# Patient Record
Sex: FEMALE | Race: BLACK OR AFRICAN AMERICAN | NOT HISPANIC OR LATINO | URBAN - METROPOLITAN AREA
[De-identification: names, ages, dates, MRNs, and addresses within clinical notes are randomized per-mention and may not be internally consistent; named-entity substitution may affect disease eponyms.]

---

## 2017-12-04 ENCOUNTER — EMERGENCY (EMERGENCY)
Facility: HOSPITAL | Age: 27
LOS: 1 days | Discharge: HOME | End: 2017-12-04
Attending: EMERGENCY MEDICINE | Admitting: EMERGENCY MEDICINE
Payer: MEDICAID

## 2017-12-04 VITALS
OXYGEN SATURATION: 100 % | RESPIRATION RATE: 16 BRPM | TEMPERATURE: 99 F | DIASTOLIC BLOOD PRESSURE: 84 MMHG | HEART RATE: 116 BPM | SYSTOLIC BLOOD PRESSURE: 127 MMHG

## 2017-12-04 PROCEDURE — 99281 EMR DPT VST MAYX REQ PHY/QHP: CPT | Mod: 25

## 2017-12-04 PROCEDURE — 10060 I&D ABSCESS SIMPLE/SINGLE: CPT | Mod: RT

## 2017-12-04 RX ORDER — OXYCODONE AND ACETAMINOPHEN 5; 325 MG/1; MG/1
1 TABLET ORAL ONCE
Qty: 0 | Refills: 0 | Status: DISCONTINUED | OUTPATIENT
Start: 2017-12-04 | End: 2017-12-04

## 2017-12-04 RX ORDER — ALPRAZOLAM 0.25 MG
0.5 TABLET ORAL ONCE
Qty: 0 | Refills: 0 | Status: DISCONTINUED | OUTPATIENT
Start: 2017-12-04 | End: 2017-12-04

## 2017-12-04 RX ADMIN — Medication 100 MILLIGRAM(S): at 16:56

## 2017-12-04 RX ADMIN — Medication 0.5 MILLIGRAM(S): at 17:36

## 2017-12-04 RX ADMIN — OXYCODONE AND ACETAMINOPHEN 1 TABLET(S): 5; 325 TABLET ORAL at 16:58

## 2017-12-04 RX ADMIN — OXYCODONE AND ACETAMINOPHEN 1 TABLET(S): 5; 325 TABLET ORAL at 16:30

## 2017-12-04 NOTE — ED PROVIDER NOTE - MEDICAL DECISION MAKING DETAILS
28 y/o female c/o right axillary redness pain and swelling  -possible early abscess vs cellulitis  -US bedside to evaluate localized fluid collection  -pain control, doxycycline   -pmd follow up for wound check

## 2017-12-04 NOTE — ED PROVIDER NOTE - SKIN WOUND TYPE
right axilla: + diffuse mild erythema to axillary area extening to lateral upper chest wall without any focal induration swelling or fluctuance. no bleeding or drainage.

## 2017-12-04 NOTE — ED PROVIDER NOTE - OBJECTIVE STATEMENT
28 y/o female hx asthma, previous axillary infections/abscesses presents to ED c/o right axiallry pain and swelling. Pt. States for the past week she has been experiencing pain redness and swellgin to the right axillary area - states similar to previous infections to the lft underam which she has needed drained. Pt. started taking old amoxicillin which has noted helped. Pt. c./o nt sweats. Denies fever chilsl bleeding or dc from area.

## 2017-12-04 NOTE — ED PROVIDER NOTE - ATTENDING CONTRIBUTION TO CARE
I performed a face to face bedside interview with patient regarding history of present illness, review of symptoms and past medical history. I completed an independent physical exam.  I have discussed patient's plan of care.   I agree with note as stated above, having amended the EMR as needed to reflect my findings. I have discussed the assessment and plan of care.  This includes during the time I functioned as the attending physician for this patient.  Attending Contribution to Care:agree with plan of PA. pt p/w abscess visualized on US. I and D performed with no complications. stable. d/c with abx, and f/u in 48 hours. return to ed. pt tolerated procedure. vss.

## 2017-12-06 ENCOUNTER — EMERGENCY (EMERGENCY)
Facility: HOSPITAL | Age: 27
LOS: 1 days | Discharge: LEFT BEFORE DISCHARGE | End: 2017-12-06
Admitting: EMERGENCY MEDICINE

## 2017-12-06 LAB — SPECIMEN SOURCE: SIGNIFICANT CHANGE UP

## 2017-12-07 LAB — BACTERIA WND CULT: SIGNIFICANT CHANGE UP

## 2017-12-07 NOTE — ED POST DISCHARGE NOTE - RESULT SUMMARY
WCX: [place] coag. neg.  No S/R profile.  Pt discharged on Doxycycline 100mg BID x 10 days.  Appropriate care in ED.  No call back necessary.

## 2018-03-13 PROBLEM — L73.2 HIDRADENITIS SUPPURATIVA OF LEFT AXILLA: Status: RESOLVED | Noted: 2018-03-13 | Resolved: 2018-03-13

## 2018-03-20 ENCOUNTER — APPOINTMENT (OUTPATIENT)
Dept: SURGERY | Facility: CLINIC | Age: 28
End: 2018-03-20

## 2018-03-20 DIAGNOSIS — L73.2 HIDRADENITIS SUPPURATIVA: ICD-10-CM

## 2018-04-12 ENCOUNTER — APPOINTMENT (OUTPATIENT)
Dept: ANTEPARTUM | Facility: CLINIC | Age: 28
End: 2018-04-12
Payer: MEDICAID

## 2018-04-12 ENCOUNTER — ASOB RESULT (OUTPATIENT)
Age: 28
End: 2018-04-12

## 2018-04-12 PROCEDURE — 36416 COLLJ CAPILLARY BLOOD SPEC: CPT

## 2018-04-12 PROCEDURE — 76813 OB US NUCHAL MEAS 1 GEST: CPT

## 2018-04-12 PROCEDURE — 76801 OB US < 14 WKS SINGLE FETUS: CPT

## 2018-06-05 ENCOUNTER — APPOINTMENT (OUTPATIENT)
Dept: ANTEPARTUM | Facility: CLINIC | Age: 28
End: 2018-06-05
Payer: MEDICAID

## 2018-06-05 ENCOUNTER — ASOB RESULT (OUTPATIENT)
Age: 28
End: 2018-06-05

## 2018-06-05 PROCEDURE — 76811 OB US DETAILED SNGL FETUS: CPT

## 2018-08-22 PROBLEM — J45.909 UNSPECIFIED ASTHMA, UNCOMPLICATED: Chronic | Status: ACTIVE | Noted: 2017-12-04

## 2018-09-04 ENCOUNTER — ASOB RESULT (OUTPATIENT)
Age: 28
End: 2018-09-04

## 2018-09-04 ENCOUNTER — APPOINTMENT (OUTPATIENT)
Dept: MATERNAL FETAL MEDICINE | Facility: CLINIC | Age: 28
End: 2018-09-04
Payer: MEDICAID

## 2018-09-04 DIAGNOSIS — O24.919 UNSPECIFIED DIABETES MELLITUS IN PREGNANCY, UNSPECIFIED TRIMESTER: ICD-10-CM

## 2018-09-04 PROCEDURE — G0109 DIAB MANAGE TRN IND/GROUP: CPT

## 2018-09-04 RX ORDER — LANCETS 33 GAUGE
EACH MISCELLANEOUS
Qty: 1 | Refills: 3 | Status: ACTIVE | COMMUNITY
Start: 2018-09-04 | End: 1900-01-01

## 2018-09-04 RX ORDER — BLOOD SUGAR DIAGNOSTIC
STRIP MISCELLANEOUS 4 TIMES DAILY
Qty: 1 | Refills: 5 | Status: ACTIVE | COMMUNITY
Start: 2018-09-04 | End: 1900-01-01

## 2018-09-11 ENCOUNTER — MESSAGE (OUTPATIENT)
Age: 28
End: 2018-09-11

## 2018-09-24 ENCOUNTER — ASOB RESULT (OUTPATIENT)
Age: 28
End: 2018-09-24

## 2018-09-24 ENCOUNTER — APPOINTMENT (OUTPATIENT)
Dept: ANTEPARTUM | Facility: CLINIC | Age: 28
End: 2018-09-24
Payer: MEDICAID

## 2018-09-24 ENCOUNTER — APPOINTMENT (OUTPATIENT)
Dept: MATERNAL FETAL MEDICINE | Facility: CLINIC | Age: 28
End: 2018-09-24
Payer: MEDICAID

## 2018-09-24 PROCEDURE — 76805 OB US >/= 14 WKS SNGL FETUS: CPT

## 2018-09-24 PROCEDURE — 76819 FETAL BIOPHYS PROFIL W/O NST: CPT

## 2018-09-24 PROCEDURE — G0108 DIAB MANAGE TRN  PER INDIV: CPT

## 2018-10-02 ENCOUNTER — APPOINTMENT (OUTPATIENT)
Dept: MATERNAL FETAL MEDICINE | Facility: CLINIC | Age: 28
End: 2018-10-02
Payer: MEDICAID

## 2018-10-02 ENCOUNTER — ASOB RESULT (OUTPATIENT)
Age: 28
End: 2018-10-02

## 2018-10-02 PROCEDURE — G0108 DIAB MANAGE TRN  PER INDIV: CPT

## 2018-10-03 RX ORDER — METFORMIN ER 500 MG 500 MG/1
500 TABLET ORAL DAILY
Qty: 60 | Refills: 3 | Status: ACTIVE | COMMUNITY
Start: 2018-10-02 | End: 1900-01-01

## 2018-10-08 ENCOUNTER — OUTPATIENT (OUTPATIENT)
Dept: OUTPATIENT SERVICES | Facility: HOSPITAL | Age: 28
LOS: 1 days | End: 2018-10-08

## 2018-10-08 DIAGNOSIS — O26.899 OTHER SPECIFIED PREGNANCY RELATED CONDITIONS, UNSPECIFIED TRIMESTER: ICD-10-CM

## 2018-10-08 DIAGNOSIS — Z3A.00 WEEKS OF GESTATION OF PREGNANCY NOT SPECIFIED: ICD-10-CM

## 2018-10-10 ENCOUNTER — APPOINTMENT (OUTPATIENT)
Dept: MATERNAL FETAL MEDICINE | Facility: CLINIC | Age: 28
End: 2018-10-10

## 2018-10-10 ENCOUNTER — APPOINTMENT (OUTPATIENT)
Dept: ANTEPARTUM | Facility: CLINIC | Age: 28
End: 2018-10-10

## 2018-10-24 ENCOUNTER — TRANSCRIPTION ENCOUNTER (OUTPATIENT)
Age: 28
End: 2018-10-24

## 2018-10-24 ENCOUNTER — INPATIENT (INPATIENT)
Facility: HOSPITAL | Age: 28
LOS: 1 days | Discharge: HOME | End: 2018-10-26
Attending: SPECIALIST | Admitting: SPECIALIST
Payer: MEDICAID

## 2018-10-24 VITALS — SYSTOLIC BLOOD PRESSURE: 131 MMHG | HEART RATE: 101 BPM | TEMPERATURE: 98 F | DIASTOLIC BLOOD PRESSURE: 78 MMHG

## 2018-10-24 LAB
ALBUMIN SERPL ELPH-MCNC: 3.1 G/DL — LOW (ref 3.3–5)
ALP SERPL-CCNC: 227 U/L — HIGH (ref 40–120)
ALT FLD-CCNC: 42 U/L — HIGH (ref 4–33)
APPEARANCE UR: SIGNIFICANT CHANGE UP
APTT BLD: 18.1 SEC — LOW (ref 27.5–37.4)
AST SERPL-CCNC: 55 U/L — HIGH (ref 4–32)
BASOPHILS # BLD AUTO: 0.04 K/UL — SIGNIFICANT CHANGE UP (ref 0–0.2)
BASOPHILS # BLD AUTO: 0.05 K/UL — SIGNIFICANT CHANGE UP (ref 0–0.2)
BASOPHILS NFR BLD AUTO: 0.2 % — SIGNIFICANT CHANGE UP (ref 0–2)
BASOPHILS NFR BLD AUTO: 0.3 % — SIGNIFICANT CHANGE UP (ref 0–2)
BILIRUB SERPL-MCNC: 0.3 MG/DL — SIGNIFICANT CHANGE UP (ref 0.2–1.2)
BILIRUB UR-MCNC: NEGATIVE — SIGNIFICANT CHANGE UP
BLD GP AB SCN SERPL QL: NEGATIVE — SIGNIFICANT CHANGE UP
BLOOD UR QL VISUAL: SIGNIFICANT CHANGE UP
BUN SERPL-MCNC: 9 MG/DL — SIGNIFICANT CHANGE UP (ref 7–23)
CALCIUM SERPL-MCNC: 9.7 MG/DL — SIGNIFICANT CHANGE UP (ref 8.4–10.5)
CHLORIDE SERPL-SCNC: 101 MMOL/L — SIGNIFICANT CHANGE UP (ref 98–107)
CO2 SERPL-SCNC: 18 MMOL/L — LOW (ref 22–31)
COLOR SPEC: SIGNIFICANT CHANGE UP
CREAT ?TM UR-MCNC: 94.5 MG/DL — SIGNIFICANT CHANGE UP
CREAT SERPL-MCNC: 0.73 MG/DL — SIGNIFICANT CHANGE UP (ref 0.5–1.3)
EOSINOPHIL # BLD AUTO: 0.01 K/UL — SIGNIFICANT CHANGE UP (ref 0–0.5)
EOSINOPHIL # BLD AUTO: 0.18 K/UL — SIGNIFICANT CHANGE UP (ref 0–0.5)
EOSINOPHIL NFR BLD AUTO: 0 % — SIGNIFICANT CHANGE UP (ref 0–6)
EOSINOPHIL NFR BLD AUTO: 1.4 % — SIGNIFICANT CHANGE UP (ref 0–6)
FIBRINOGEN PPP-MCNC: 685.2 MG/DL — HIGH (ref 310–510)
GLUCOSE BLDC GLUCOMTR-MCNC: 103 MG/DL — HIGH (ref 70–99)
GLUCOSE SERPL-MCNC: 129 MG/DL — HIGH (ref 70–99)
GLUCOSE UR-MCNC: NEGATIVE — SIGNIFICANT CHANGE UP
HCT VFR BLD CALC: 32.6 % — LOW (ref 34.5–45)
HCT VFR BLD CALC: 36.2 % — SIGNIFICANT CHANGE UP (ref 34.5–45)
HGB BLD-MCNC: 10.9 G/DL — LOW (ref 11.5–15.5)
HGB BLD-MCNC: 11.9 G/DL — SIGNIFICANT CHANGE UP (ref 11.5–15.5)
IMM GRANULOCYTES # BLD AUTO: 0.09 # — SIGNIFICANT CHANGE UP
IMM GRANULOCYTES # BLD AUTO: 0.19 # — SIGNIFICANT CHANGE UP
IMM GRANULOCYTES NFR BLD AUTO: 0.7 % — SIGNIFICANT CHANGE UP (ref 0–1.5)
IMM GRANULOCYTES NFR BLD AUTO: 0.8 % — SIGNIFICANT CHANGE UP (ref 0–1.5)
INR BLD: 0.94 — SIGNIFICANT CHANGE UP (ref 0.88–1.17)
KETONES UR-MCNC: NEGATIVE — SIGNIFICANT CHANGE UP
LDH SERPL L TO P-CCNC: 281 U/L — HIGH (ref 135–225)
LEUKOCYTE ESTERASE UR-ACNC: SIGNIFICANT CHANGE UP
LYMPHOCYTES # BLD AUTO: 1.22 K/UL — SIGNIFICANT CHANGE UP (ref 1–3.3)
LYMPHOCYTES # BLD AUTO: 2.75 K/UL — SIGNIFICANT CHANGE UP (ref 1–3.3)
LYMPHOCYTES # BLD AUTO: 21.3 % — SIGNIFICANT CHANGE UP (ref 13–44)
LYMPHOCYTES # BLD AUTO: 5.3 % — LOW (ref 13–44)
MAGNESIUM SERPL-MCNC: 4.7 MG/DL — HIGH (ref 1.6–2.6)
MCHC RBC-ENTMCNC: 27.7 PG — SIGNIFICANT CHANGE UP (ref 27–34)
MCHC RBC-ENTMCNC: 28.2 PG — SIGNIFICANT CHANGE UP (ref 27–34)
MCHC RBC-ENTMCNC: 32.9 % — SIGNIFICANT CHANGE UP (ref 32–36)
MCHC RBC-ENTMCNC: 33.4 % — SIGNIFICANT CHANGE UP (ref 32–36)
MCV RBC AUTO: 84.4 FL — SIGNIFICANT CHANGE UP (ref 80–100)
MCV RBC AUTO: 84.5 FL — SIGNIFICANT CHANGE UP (ref 80–100)
MONOCYTES # BLD AUTO: 0.98 K/UL — HIGH (ref 0–0.9)
MONOCYTES # BLD AUTO: 1.16 K/UL — HIGH (ref 0–0.9)
MONOCYTES NFR BLD AUTO: 5 % — SIGNIFICANT CHANGE UP (ref 2–14)
MONOCYTES NFR BLD AUTO: 7.6 % — SIGNIFICANT CHANGE UP (ref 2–14)
NEUTROPHILS # BLD AUTO: 20.46 K/UL — HIGH (ref 1.8–7.4)
NEUTROPHILS # BLD AUTO: 8.9 K/UL — HIGH (ref 1.8–7.4)
NEUTROPHILS NFR BLD AUTO: 68.7 % — SIGNIFICANT CHANGE UP (ref 43–77)
NEUTROPHILS NFR BLD AUTO: 88.7 % — HIGH (ref 43–77)
NITRITE UR-MCNC: NEGATIVE — SIGNIFICANT CHANGE UP
NRBC # FLD: 0 — SIGNIFICANT CHANGE UP
NRBC # FLD: 0 — SIGNIFICANT CHANGE UP
PH UR: 6 — SIGNIFICANT CHANGE UP (ref 5–8)
PLATELET # BLD AUTO: 308 K/UL — SIGNIFICANT CHANGE UP (ref 150–400)
PLATELET # BLD AUTO: 349 K/UL — SIGNIFICANT CHANGE UP (ref 150–400)
PMV BLD: 10 FL — SIGNIFICANT CHANGE UP (ref 7–13)
PMV BLD: 10.1 FL — SIGNIFICANT CHANGE UP (ref 7–13)
POTASSIUM SERPL-MCNC: 3.8 MMOL/L — SIGNIFICANT CHANGE UP (ref 3.5–5.3)
POTASSIUM SERPL-SCNC: 3.8 MMOL/L — SIGNIFICANT CHANGE UP (ref 3.5–5.3)
PROT SERPL-MCNC: 7 G/DL — SIGNIFICANT CHANGE UP (ref 6–8.3)
PROT UR-MCNC: > 200 MG/DL — SIGNIFICANT CHANGE UP
PROT UR-MCNC: >300 — SIGNIFICANT CHANGE UP
PROTHROM AB SERPL-ACNC: 10.8 SEC — SIGNIFICANT CHANGE UP (ref 9.8–13.1)
RBC # BLD: 3.86 M/UL — SIGNIFICANT CHANGE UP (ref 3.8–5.2)
RBC # BLD: 4.29 M/UL — SIGNIFICANT CHANGE UP (ref 3.8–5.2)
RBC # FLD: 16.9 % — HIGH (ref 10.3–14.5)
RBC # FLD: 17.1 % — HIGH (ref 10.3–14.5)
RBC CASTS # UR COMP ASSIST: >50 — HIGH (ref 0–?)
RH IG SCN BLD-IMP: POSITIVE — SIGNIFICANT CHANGE UP
SODIUM SERPL-SCNC: 137 MMOL/L — SIGNIFICANT CHANGE UP (ref 135–145)
SP GR SPEC: 1.02 — SIGNIFICANT CHANGE UP (ref 1–1.04)
T PALLIDUM AB TITR SER: NEGATIVE — SIGNIFICANT CHANGE UP
URATE SERPL-MCNC: 8.4 MG/DL — HIGH (ref 2.5–7)
UROBILINOGEN FLD QL: NORMAL — SIGNIFICANT CHANGE UP
WBC # BLD: 12.94 K/UL — HIGH (ref 3.8–10.5)
WBC # BLD: 23.09 K/UL — HIGH (ref 3.8–10.5)
WBC # FLD AUTO: 12.94 K/UL — HIGH (ref 3.8–10.5)
WBC # FLD AUTO: 23.09 K/UL — HIGH (ref 3.8–10.5)
WBC UR QL: SIGNIFICANT CHANGE UP (ref 0–?)

## 2018-10-24 PROCEDURE — 93010 ELECTROCARDIOGRAM REPORT: CPT

## 2018-10-24 RX ORDER — SODIUM CHLORIDE 9 MG/ML
1000 INJECTION, SOLUTION INTRAVENOUS
Qty: 0 | Refills: 0 | Status: DISCONTINUED | OUTPATIENT
Start: 2018-10-24 | End: 2018-10-24

## 2018-10-24 RX ORDER — HYDROCORTISONE 1 %
1 OINTMENT (GRAM) TOPICAL EVERY 4 HOURS
Qty: 0 | Refills: 0 | Status: DISCONTINUED | OUTPATIENT
Start: 2018-10-24 | End: 2018-10-24

## 2018-10-24 RX ORDER — CEFAZOLIN SODIUM 1 G
2000 VIAL (EA) INJECTION ONCE
Qty: 0 | Refills: 0 | Status: COMPLETED | OUTPATIENT
Start: 2018-10-24 | End: 2018-10-24

## 2018-10-24 RX ORDER — OXYCODONE HYDROCHLORIDE 5 MG/1
5 TABLET ORAL EVERY 4 HOURS
Qty: 0 | Refills: 0 | Status: DISCONTINUED | OUTPATIENT
Start: 2018-10-24 | End: 2018-10-26

## 2018-10-24 RX ORDER — ACETAMINOPHEN 500 MG
975 TABLET ORAL EVERY 6 HOURS
Qty: 0 | Refills: 0 | Status: DISCONTINUED | OUTPATIENT
Start: 2018-10-24 | End: 2018-10-24

## 2018-10-24 RX ORDER — SODIUM CHLORIDE 9 MG/ML
500 INJECTION, SOLUTION INTRAVENOUS
Qty: 0 | Refills: 0 | Status: DISCONTINUED | OUTPATIENT
Start: 2018-10-24 | End: 2018-10-24

## 2018-10-24 RX ORDER — ACETAMINOPHEN 500 MG
975 TABLET ORAL EVERY 6 HOURS
Qty: 0 | Refills: 0 | Status: COMPLETED | OUTPATIENT
Start: 2018-10-24 | End: 2019-09-22

## 2018-10-24 RX ORDER — HYDROCORTISONE 1 %
1 OINTMENT (GRAM) TOPICAL EVERY 4 HOURS
Qty: 0 | Refills: 0 | Status: DISCONTINUED | OUTPATIENT
Start: 2018-10-24 | End: 2018-10-26

## 2018-10-24 RX ORDER — IBUPROFEN 200 MG
600 TABLET ORAL EVERY 6 HOURS
Qty: 0 | Refills: 0 | Status: DISCONTINUED | OUTPATIENT
Start: 2018-10-24 | End: 2018-10-26

## 2018-10-24 RX ORDER — AER TRAVELER 0.5 G/1
1 SOLUTION RECTAL; TOPICAL EVERY 4 HOURS
Qty: 0 | Refills: 0 | Status: DISCONTINUED | OUTPATIENT
Start: 2018-10-24 | End: 2018-10-26

## 2018-10-24 RX ORDER — AER TRAVELER 0.5 G/1
1 SOLUTION RECTAL; TOPICAL EVERY 4 HOURS
Qty: 0 | Refills: 0 | Status: DISCONTINUED | OUTPATIENT
Start: 2018-10-24 | End: 2018-10-24

## 2018-10-24 RX ORDER — KETOROLAC TROMETHAMINE 30 MG/ML
30 SYRINGE (ML) INJECTION ONCE
Qty: 0 | Refills: 0 | Status: DISCONTINUED | OUTPATIENT
Start: 2018-10-24 | End: 2018-10-24

## 2018-10-24 RX ORDER — DIBUCAINE 1 %
1 OINTMENT (GRAM) RECTAL EVERY 4 HOURS
Qty: 0 | Refills: 0 | Status: DISCONTINUED | OUTPATIENT
Start: 2018-10-24 | End: 2018-10-24

## 2018-10-24 RX ORDER — OXYTOCIN 10 UNIT/ML
41.67 VIAL (ML) INJECTION
Qty: 20 | Refills: 0 | Status: DISCONTINUED | OUTPATIENT
Start: 2018-10-24 | End: 2018-10-24

## 2018-10-24 RX ORDER — SODIUM CHLORIDE 9 MG/ML
500 INJECTION, SOLUTION INTRAVENOUS ONCE
Qty: 0 | Refills: 0 | Status: COMPLETED | OUTPATIENT
Start: 2018-10-24 | End: 2018-10-24

## 2018-10-24 RX ORDER — SODIUM CHLORIDE 9 MG/ML
3 INJECTION INTRAMUSCULAR; INTRAVENOUS; SUBCUTANEOUS EVERY 8 HOURS
Qty: 0 | Refills: 0 | Status: DISCONTINUED | OUTPATIENT
Start: 2018-10-24 | End: 2018-10-26

## 2018-10-24 RX ORDER — MAGNESIUM SULFATE 500 MG/ML
2 VIAL (ML) INJECTION
Qty: 40 | Refills: 0 | Status: DISCONTINUED | OUTPATIENT
Start: 2018-10-24 | End: 2018-10-25

## 2018-10-24 RX ORDER — DOCUSATE SODIUM 100 MG
100 CAPSULE ORAL
Qty: 0 | Refills: 0 | Status: DISCONTINUED | OUTPATIENT
Start: 2018-10-24 | End: 2018-10-26

## 2018-10-24 RX ORDER — MAGNESIUM SULFATE 500 MG/ML
4 VIAL (ML) INJECTION ONCE
Qty: 0 | Refills: 0 | Status: COMPLETED | OUTPATIENT
Start: 2018-10-24 | End: 2018-10-24

## 2018-10-24 RX ORDER — OXYCODONE HYDROCHLORIDE 5 MG/1
5 TABLET ORAL
Qty: 0 | Refills: 0 | Status: DISCONTINUED | OUTPATIENT
Start: 2018-10-24 | End: 2018-10-26

## 2018-10-24 RX ORDER — PRAMOXINE HYDROCHLORIDE 150 MG/15G
1 AEROSOL, FOAM RECTAL EVERY 4 HOURS
Qty: 0 | Refills: 0 | Status: DISCONTINUED | OUTPATIENT
Start: 2018-10-24 | End: 2018-10-26

## 2018-10-24 RX ORDER — INFLUENZA VIRUS VACCINE 15; 15; 15; 15 UG/.5ML; UG/.5ML; UG/.5ML; UG/.5ML
0.5 SUSPENSION INTRAMUSCULAR ONCE
Qty: 0 | Refills: 0 | Status: COMPLETED | OUTPATIENT
Start: 2018-10-24 | End: 2018-10-24

## 2018-10-24 RX ORDER — TETANUS TOXOID, REDUCED DIPHTHERIA TOXOID AND ACELLULAR PERTUSSIS VACCINE, ADSORBED 5; 2.5; 8; 8; 2.5 [IU]/.5ML; [IU]/.5ML; UG/.5ML; UG/.5ML; UG/.5ML
0.5 SUSPENSION INTRAMUSCULAR ONCE
Qty: 0 | Refills: 0 | Status: DISCONTINUED | OUTPATIENT
Start: 2018-10-24 | End: 2018-10-26

## 2018-10-24 RX ORDER — GLYCERIN ADULT
1 SUPPOSITORY, RECTAL RECTAL AT BEDTIME
Qty: 0 | Refills: 0 | Status: DISCONTINUED | OUTPATIENT
Start: 2018-10-24 | End: 2018-10-26

## 2018-10-24 RX ORDER — PRAMOXINE HYDROCHLORIDE 150 MG/15G
1 AEROSOL, FOAM RECTAL EVERY 4 HOURS
Qty: 0 | Refills: 0 | Status: DISCONTINUED | OUTPATIENT
Start: 2018-10-24 | End: 2018-10-24

## 2018-10-24 RX ORDER — SIMETHICONE 80 MG/1
80 TABLET, CHEWABLE ORAL EVERY 6 HOURS
Qty: 0 | Refills: 0 | Status: DISCONTINUED | OUTPATIENT
Start: 2018-10-24 | End: 2018-10-26

## 2018-10-24 RX ORDER — OXYTOCIN 10 UNIT/ML
41.67 VIAL (ML) INJECTION
Qty: 20 | Refills: 0 | Status: DISCONTINUED | OUTPATIENT
Start: 2018-10-24 | End: 2018-10-25

## 2018-10-24 RX ORDER — SODIUM CHLORIDE 9 MG/ML
1000 INJECTION, SOLUTION INTRAVENOUS
Qty: 0 | Refills: 0 | Status: DISCONTINUED | OUTPATIENT
Start: 2018-10-24 | End: 2018-10-25

## 2018-10-24 RX ORDER — IBUPROFEN 200 MG
600 TABLET ORAL EVERY 6 HOURS
Qty: 0 | Refills: 0 | Status: DISCONTINUED | OUTPATIENT
Start: 2018-10-24 | End: 2018-10-24

## 2018-10-24 RX ORDER — OXYTOCIN 10 UNIT/ML
333.33 VIAL (ML) INJECTION
Qty: 20 | Refills: 0 | Status: COMPLETED | OUTPATIENT
Start: 2018-10-24

## 2018-10-24 RX ORDER — DIBUCAINE 1 %
1 OINTMENT (GRAM) RECTAL EVERY 4 HOURS
Qty: 0 | Refills: 0 | Status: DISCONTINUED | OUTPATIENT
Start: 2018-10-24 | End: 2018-10-26

## 2018-10-24 RX ORDER — IBUPROFEN 200 MG
600 TABLET ORAL EVERY 6 HOURS
Qty: 0 | Refills: 0 | Status: COMPLETED | OUTPATIENT
Start: 2018-10-24 | End: 2019-09-22

## 2018-10-24 RX ORDER — SODIUM CHLORIDE 9 MG/ML
1000 INJECTION, SOLUTION INTRAVENOUS ONCE
Qty: 0 | Refills: 0 | Status: DISCONTINUED | OUTPATIENT
Start: 2018-10-24 | End: 2018-10-24

## 2018-10-24 RX ORDER — MAGNESIUM HYDROXIDE 400 MG/1
30 TABLET, CHEWABLE ORAL
Qty: 0 | Refills: 0 | Status: DISCONTINUED | OUTPATIENT
Start: 2018-10-24 | End: 2018-10-26

## 2018-10-24 RX ORDER — SODIUM CHLORIDE 9 MG/ML
3 INJECTION INTRAMUSCULAR; INTRAVENOUS; SUBCUTANEOUS EVERY 8 HOURS
Qty: 0 | Refills: 0 | Status: DISCONTINUED | OUTPATIENT
Start: 2018-10-24 | End: 2018-10-24

## 2018-10-24 RX ORDER — ACETAMINOPHEN 500 MG
975 TABLET ORAL EVERY 6 HOURS
Qty: 0 | Refills: 0 | Status: DISCONTINUED | OUTPATIENT
Start: 2018-10-24 | End: 2018-10-26

## 2018-10-24 RX ORDER — OXYTOCIN 10 UNIT/ML
333.33 VIAL (ML) INJECTION
Qty: 20 | Refills: 0 | Status: DISCONTINUED | OUTPATIENT
Start: 2018-10-24 | End: 2018-10-25

## 2018-10-24 RX ORDER — LANOLIN
1 OINTMENT (GRAM) TOPICAL EVERY 6 HOURS
Qty: 0 | Refills: 0 | Status: DISCONTINUED | OUTPATIENT
Start: 2018-10-24 | End: 2018-10-26

## 2018-10-24 RX ORDER — DIPHENHYDRAMINE HCL 50 MG
25 CAPSULE ORAL EVERY 6 HOURS
Qty: 0 | Refills: 0 | Status: DISCONTINUED | OUTPATIENT
Start: 2018-10-24 | End: 2018-10-26

## 2018-10-24 RX ADMIN — Medication 50 GM/HR: at 13:06

## 2018-10-24 RX ADMIN — Medication 0.25 MILLIGRAM(S): at 05:21

## 2018-10-24 RX ADMIN — Medication 300 GRAM(S): at 10:23

## 2018-10-24 RX ADMIN — SODIUM CHLORIDE 125 MILLILITER(S): 9 INJECTION, SOLUTION INTRAVENOUS at 05:15

## 2018-10-24 RX ADMIN — Medication 100 MILLIGRAM(S): at 10:55

## 2018-10-24 RX ADMIN — Medication 50 GM/HR: at 10:55

## 2018-10-24 RX ADMIN — Medication 125 MILLIUNIT(S)/MIN: at 06:57

## 2018-10-24 RX ADMIN — Medication 50 GM/HR: at 19:12

## 2018-10-24 RX ADMIN — SODIUM CHLORIDE 50 MILLILITER(S): 9 INJECTION, SOLUTION INTRAVENOUS at 11:21

## 2018-10-24 RX ADMIN — Medication 30 MILLIGRAM(S): at 07:15

## 2018-10-24 RX ADMIN — Medication 1000 MILLIUNIT(S)/MIN: at 06:32

## 2018-10-24 RX ADMIN — Medication 30 MILLIGRAM(S): at 06:36

## 2018-10-24 RX ADMIN — SODIUM CHLORIDE 1000 MILLILITER(S): 9 INJECTION, SOLUTION INTRAVENOUS at 09:20

## 2018-10-24 NOTE — DISCHARGE NOTE OB - CARE PLAN
Principal Discharge DX:	 (normal spontaneous vaginal delivery)  Goal:	Good recovery  Assessment and plan of treatment:	f/u 6 weeks

## 2018-10-24 NOTE — DISCHARGE NOTE OB - PATIENT PORTAL LINK FT
You can access the [facility], offered by [facility], by registering with the following website: [email or web address]

## 2018-10-24 NOTE — PATIENT PROFILE OB - PRO HIV INFANT
NO DIABETIC RETINOPATHY NOTED ON TODAYS EXAM: RETURN FOR FOLLOW-UP AS SCHEDULED FOR DILATED EXAM AND OCT FOR ANY PROGRESSION. negative

## 2018-10-24 NOTE — DISCHARGE NOTE OB - CARE PROVIDER_API CALL
Lucia Wilson), Obstetrics and Gynecology  [address] [place] [address]  Phone: [phone]  Fax: [phone]

## 2018-10-24 NOTE — DISCHARGE NOTE OB - MATERIALS PROVIDED
Shaken Baby Prevention Handout/Discharge Medication Information for Patients and Families Pocket Guide/  Immunization Record/[facility] [place] Screening Program/Guide to Postpartum Care

## 2018-10-25 LAB
BASOPHILS # BLD AUTO: 0.04 K/UL — SIGNIFICANT CHANGE UP (ref 0–0.2)
BASOPHILS NFR BLD AUTO: 0.2 % — SIGNIFICANT CHANGE UP (ref 0–2)
BASOPHILS NFR SPEC: 0 % — SIGNIFICANT CHANGE UP (ref 0–2)
BLASTS # FLD: 0 % — SIGNIFICANT CHANGE UP (ref 0–0)
EOSINOPHIL # BLD AUTO: 0.1 K/UL — SIGNIFICANT CHANGE UP (ref 0–0.5)
EOSINOPHIL NFR BLD AUTO: 0.5 % — SIGNIFICANT CHANGE UP (ref 0–6)
EOSINOPHIL NFR FLD: 0.9 % — SIGNIFICANT CHANGE UP (ref 0–6)
GIANT PLATELETS BLD QL SMEAR: PRESENT — SIGNIFICANT CHANGE UP
HCT VFR BLD CALC: 28.9 % — LOW (ref 34.5–45)
HGB BLD-MCNC: 9.3 G/DL — LOW (ref 11.5–15.5)
HYPOCHROMIA BLD QL: SLIGHT — SIGNIFICANT CHANGE UP
IMM GRANULOCYTES # BLD AUTO: 0.13 # — SIGNIFICANT CHANGE UP
IMM GRANULOCYTES NFR BLD AUTO: 0.6 % — SIGNIFICANT CHANGE UP (ref 0–1.5)
LYMPHOCYTES # BLD AUTO: 13.5 % — SIGNIFICANT CHANGE UP (ref 13–44)
LYMPHOCYTES # BLD AUTO: 2.74 K/UL — SIGNIFICANT CHANGE UP (ref 1–3.3)
LYMPHOCYTES NFR SPEC AUTO: 15.9 % — SIGNIFICANT CHANGE UP (ref 13–44)
MAGNESIUM SERPL-MCNC: 5.4 MG/DL — HIGH (ref 1.6–2.6)
MAGNESIUM SERPL-MCNC: 5.7 MG/DL — HIGH (ref 1.6–2.6)
MCHC RBC-ENTMCNC: 27 PG — SIGNIFICANT CHANGE UP (ref 27–34)
MCHC RBC-ENTMCNC: 32.2 % — SIGNIFICANT CHANGE UP (ref 32–36)
MCV RBC AUTO: 83.8 FL — SIGNIFICANT CHANGE UP (ref 80–100)
METAMYELOCYTES # FLD: 0 % — SIGNIFICANT CHANGE UP (ref 0–1)
MICROCYTES BLD QL: SLIGHT — SIGNIFICANT CHANGE UP
MONOCYTES # BLD AUTO: 1.54 K/UL — HIGH (ref 0–0.9)
MONOCYTES NFR BLD AUTO: 7.6 % — SIGNIFICANT CHANGE UP (ref 2–14)
MONOCYTES NFR BLD: 4.4 % — SIGNIFICANT CHANGE UP (ref 2–9)
MYELOCYTES NFR BLD: 0 % — SIGNIFICANT CHANGE UP (ref 0–0)
NEUTROPHIL AB SER-ACNC: 77 % — SIGNIFICANT CHANGE UP (ref 43–77)
NEUTROPHILS # BLD AUTO: 15.71 K/UL — HIGH (ref 1.8–7.4)
NEUTROPHILS NFR BLD AUTO: 77.6 % — HIGH (ref 43–77)
NEUTS BAND # BLD: 0 % — SIGNIFICANT CHANGE UP (ref 0–6)
NRBC # FLD: 0 — SIGNIFICANT CHANGE UP
OTHER - HEMATOLOGY %: 0 — SIGNIFICANT CHANGE UP
PLATELET # BLD AUTO: 297 K/UL — SIGNIFICANT CHANGE UP (ref 150–400)
PLATELET COUNT - ESTIMATE: NORMAL — SIGNIFICANT CHANGE UP
PMV BLD: 9.9 FL — SIGNIFICANT CHANGE UP (ref 7–13)
POLYCHROMASIA BLD QL SMEAR: SLIGHT — SIGNIFICANT CHANGE UP
PROMYELOCYTES # FLD: 0 % — SIGNIFICANT CHANGE UP (ref 0–0)
RBC # BLD: 3.45 M/UL — LOW (ref 3.8–5.2)
RBC # FLD: 17.1 % — HIGH (ref 10.3–14.5)
VARIANT LYMPHS # BLD: 1.8 % — SIGNIFICANT CHANGE UP
WBC # BLD: 20.26 K/UL — HIGH (ref 3.8–10.5)
WBC # FLD AUTO: 20.26 K/UL — HIGH (ref 3.8–10.5)

## 2018-10-25 RX ADMIN — Medication 1 TABLET(S): at 12:34

## 2018-10-25 RX ADMIN — SODIUM CHLORIDE 3 MILLILITER(S): 9 INJECTION INTRAMUSCULAR; INTRAVENOUS; SUBCUTANEOUS at 05:38

## 2018-10-25 RX ADMIN — SODIUM CHLORIDE 3 MILLILITER(S): 9 INJECTION INTRAMUSCULAR; INTRAVENOUS; SUBCUTANEOUS at 14:00

## 2018-10-25 NOTE — LACTATION INITIAL EVALUATION - INTERVENTION OUTCOME
verbalizes understanding/nbn demonstrated  deep latch and  performed  with sucking and swallowing  noted ,  reviewed treatment  and  prevention  of  sore  nipple , reviewed  strategies  to  correct  latch,  rn  aware  ,  noted  deeper  latch  in  football  hold. verbalizes understanding/offered assistance  as  needed  . reviewed  strategies  to  bring  out  nipple.,  reviewed  use  of  hand  pump  to  bring  out  nipple  .  call for  assistance  as  needed

## 2018-10-25 NOTE — PROGRESS NOTE ADULT - SUBJECTIVE AND OBJECTIVE BOX
OB Progress Note:  PPD#1    S: 29yo  PPD#1 s/p  c/b sPEC s/p Mg, tachycardia during labor EKG WNL, s/p ancef for ruptured L thigh boil. Patient feels well. Pain is well controlled. She is tolerating a regular diet. She is voiding spontaneously, and ambulating without difficulty. Denies CP/SOB. Denies lightheadedness/dizziness. Denies N/V.    O:  Vitals:  Vital Signs Last 24 Hrs  T(C): 36.8 (25 Oct 2018 06:00), Max: 37.4 (24 Oct 2018 17:48)  T(F): 98.2 (25 Oct 2018 06:00), Max: 99.3 (24 Oct 2018 17:48)  HR: 109 (25 Oct 2018 06:00) (103 - 121)  BP: 135/82 (25 Oct 2018 06:00) (120/62 - 144/88)  BP(mean): 0 (24 Oct 2018 12:20) (0 - 0)  RR: 18 (25 Oct 2018 06:00) (16 - 18)  SpO2: 100% (25 Oct 2018 06:00) (97% - 100%)    MEDICATIONS  (STANDING):  acetaminophen    Suspension .. 975 milliGRAM(s) Oral every 6 hours  diphtheria/tetanus/pertussis (acellular) Vaccine (ADAcel) 0.5 milliLiter(s) IntraMuscular once  ibuprofen  Suspension. 600 milliGRAM(s) Oral every 6 hours  influenza   Vaccine 0.5 milliLiter(s) IntraMuscular once  lactated ringers. 1000 milliLiter(s) (50 mL/Hr) IV Continuous <Continuous>  oxyCODONE    IR 5 milliGRAM(s) Oral every 3 hours  prenatal multivitamin 1 Tablet(s) Oral daily  sodium chloride 0.9% lock flush 3 milliLiter(s) IV Push every 8 hours      Labs:  Blood type: AB Positive  Rubella IgG: RPR: Negative                          9.3<L>   20.26<H> >-----------< 297    ( 10-25 @ 05:30 )             28.9<L>                        10.9<L>   23.09<H> >-----------< 308    ( 10-24 @ 07:55 )             32.6<L>                        11.9   12.94<H> >-----------< 349    ( 10-24 @ 05:15 )             36.2    10-24-18 @ 07:55      137  |  101  |  9   ----------------------------<  129<H>  3.8   |  18<L>  |  0.73        Ca    9.7      24 Oct 2018 07:55  Mg     5.7<H>     10-25  Mg     5.4<H>     10-24  Mg     4.7<H>     10-24    TPro  7.0  /  Alb  3.1<L>  /  TBili  0.3  /  DBili  x   /  AST  55<H>  /  ALT  42<H>  /  AlkPhos  227<H>  10-24-18 @ 07:55          Physical Exam:  General: NAD  Abdomen: soft, non-tender, non-distended, fundus firm  Extremities: No erythema/edema

## 2018-10-25 NOTE — LACTATION INITIAL EVALUATION - LACTATION INTERVENTIONS
assisted with deep latch and positioning  discussed  signs  of  effective  feeding and  swallowing.  discussed  compression at  breast when  nbn  stops  drinking  and  is  still sucking.  instructed  to offer both  breast at a feeding ,feed on cue and safe  skin to skin./initiate hand expression routine/initiate skin to skin discussed  signs  of  effective  feeding and  swallowing.  discussed  compression at  breast when  nbn  stops  drinking  and  is  still sucking.  instructed  to offer both  breast at a feeding ,feed on cue and safe  skin to skin.

## 2018-10-25 NOTE — LACTATION INITIAL EVALUATION - POTENTIAL FOR
ineffective breastfeeding/knowledge deficit ineffective breastfeeding/feeding confusion/knowledge deficit

## 2018-10-25 NOTE — PROGRESS NOTE ADULT - ASSESSMENT
A/P: 29yo PPD#1 s/p  c/b sPEC s/p Mg, tachycardia during labor EKG WNL, s/p ancef for ruptured L thigh boil.  Patient is stable and doing well post-partum.

## 2018-10-26 VITALS
SYSTOLIC BLOOD PRESSURE: 119 MMHG | DIASTOLIC BLOOD PRESSURE: 69 MMHG | RESPIRATION RATE: 18 BRPM | TEMPERATURE: 98 F | OXYGEN SATURATION: 100 % | HEART RATE: 96 BPM

## 2018-10-26 RX ADMIN — SODIUM CHLORIDE 3 MILLILITER(S): 9 INJECTION INTRAMUSCULAR; INTRAVENOUS; SUBCUTANEOUS at 06:32

## 2018-10-26 RX ADMIN — SODIUM CHLORIDE 3 MILLILITER(S): 9 INJECTION INTRAMUSCULAR; INTRAVENOUS; SUBCUTANEOUS at 00:00

## 2018-10-26 NOTE — PROGRESS NOTE ADULT - ASSESSMENT
A/P: 29yo PPD#2 s/p  complicated by pre-eclampsia with severe features s/p magnesium.Patient is stable and doing well post-partum.

## 2018-10-26 NOTE — PROGRESS NOTE ADULT - SUBJECTIVE AND OBJECTIVE BOX
OB Progress Note:  PPD#2    S: 27yo PPD#2 s/p  complicated by pre-eclampsia with severe features s/p magnesium. Patient feels well. Pain is well controlled. She is tolerating a regular diet and passing flatus. She is voiding spontaneously, and ambulating without difficulty. Denies CP/SOB. Denies lightheadedness/dizziness. Denies N/V. Denies heavy vaginal bleeding.    O:  Vitals:   Vital Signs Last 24 Hrs  T(C): 36.8 (26 Oct 2018 06:00), Max: 36.8 (26 Oct 2018 06:00)  T(F): 98.2 (26 Oct 2018 06:00), Max: 98.2 (26 Oct 2018 06:00)  HR: 96 (26 Oct 2018 06:00) (96 - 100)  BP: 119/69 (26 Oct 2018 06:00) (119/69 - 127/69)  BP(mean): --  RR: 18 (26 Oct 2018 06:00) (18 - 18)  SpO2: 100% (26 Oct 2018 06:00) (100% - 100%)    MEDICATIONS  (STANDING):  acetaminophen    Suspension .. 975 milliGRAM(s) Oral every 6 hours  diphtheria/tetanus/pertussis (acellular) Vaccine (ADAcel) 0.5 milliLiter(s) IntraMuscular once  ibuprofen  Suspension. 600 milliGRAM(s) Oral every 6 hours  influenza   Vaccine 0.5 milliLiter(s) IntraMuscular once  oxyCODONE    IR 5 milliGRAM(s) Oral every 3 hours  prenatal multivitamin 1 Tablet(s) Oral daily  sodium chloride 0.9% lock flush 3 milliLiter(s) IV Push every 8 hours    MEDICATIONS  (PRN):  dibucaine 1% Ointment 1 Application(s) Topical every 4 hours PRN Perineal Discomfort  diphenhydrAMINE 25 milliGRAM(s) Oral every 6 hours PRN Itching  docusate sodium 100 milliGRAM(s) Oral two times a day PRN Stool Softening  glycerin Suppository - Adult 1 Suppository(s) Rectal at bedtime PRN Constipation  hydrocortisone 1% Cream 1 Application(s) Topical every 4 hours PRN perineal discomfort  lanolin Ointment 1 Application(s) Topical every 6 hours PRN Sore Nipples  magnesium hydroxide Suspension 30 milliLiter(s) Oral two times a day PRN Constipation  oxyCODONE    IR 5 milliGRAM(s) Oral every 4 hours PRN Severe Pain (7 -10)  pramoxine 1%/zinc 5% Cream 1 Application(s) Topical every 4 hours PRN perineal discomfort  simethicone 80 milliGRAM(s) Chew every 6 hours PRN Gas  witch hazel Pads 1 Application(s) Topical every 4 hours PRN Perineal Discomfort      Labs:  Blood type: AB Positive  Rubella IgG: RPR: Negative                          9.3<L>   20.26<H> >-----------< 297    ( 10-25 @ 05:30 )             28.9<L>                        10.9<L>   23.09<H> >-----------< 308    ( 10-24 @ 07:55 )             32.6<L>                        11.9   12.94<H> >-----------< 349    ( 10-24 @ 05:15 )             36.2    10-24-18 @ 07:55      137  |  101  |  9   ----------------------------<  129<H>  3.8   |  18<L>  |  0.73        Ca    9.7      24 Oct 2018 07:55  Mg     5.7<H>     10-25  Mg     5.4<H>     10-24  Mg     4.7<H>     10-24    TPro  7.0  /  Alb  3.1<L>  /  TBili  0.3  /  DBili  x   /  AST  55<H>  /  ALT  42<H>  /  AlkPhos  227<H>  10-24-18 @ 07:55          Physical Exam:  General: NAD  Abdomen: soft, non-tender, non-distended, fundus firm  Vaginal: Lochia wnl  Extremities: No erythema/edema

## 2018-10-26 NOTE — PROGRESS NOTE ADULT - SUBJECTIVE AND OBJECTIVE BOX
presently comfortable trying to breast feed. Blood pressure w/in normal range   C/o soreness in perienum

## 2022-06-30 NOTE — ED PROVIDER NOTE - PMH
Health Status:  Gabbie is doing okay.  No concerns at this time.    Medication/s:  None were prescribed in the ED.    F/U Appointment/s:  OB/Gyn 7/18.     Gabbie did not have any other questions/concerns regarding the ED visit.    
Asthma

## 2022-09-08 NOTE — DISCHARGE NOTE OB - BREAST MILK SUPPORTS STABLE NEWBORN BLOOD SUGAR
Name: Dina Myrick  MRN: [id number]  : 1974    HPI  Here to establish care. Recently seen by NP Mya Chilel, started on losartan for HTN. Given literature for DASH diet.    STOP-BANG score is 3. Snores, but not loudly. Doesn't feel refreshed in morning.     Review of Systems   Constitutional:  Negative for chills and fever.   Respiratory:  Negative for shortness of breath.    Cardiovascular:  Negative for chest pain.   Gastrointestinal:  Negative for constipation, diarrhea, nausea and vomiting.   Musculoskeletal:  Positive for arthralgias (left leg aching for a few weeks). Negative for back pain.   Skin:  Negative for color change and rash.   Neurological:  Positive for headaches (improving with treatment of sinuses). Negative for facial asymmetry and numbness.   Psychiatric/Behavioral:  Negative for sleep disturbance.       Patient Active Problem List   Diagnosis    Essential hypertension    Class 2 severe obesity due to excess calories with serious comorbidity and body mass index (BMI) of 35.0 to 35.9 in adult    Iron deficiency anemia secondary to inadequate dietary iron intake    Seasonal allergic rhinitis due to pollen       Current Outpatient Medications on File Prior to Visit   Medication Sig Dispense Refill    azelastine (ASTELIN) 137 mcg (0.1 %) nasal spray 1 spray (137 mcg total) by Nasal route 2 (two) times daily. 30 mL 11    fluticasone propionate (FLONASE) 50 mcg/actuation nasal spray 1 spray (50 mcg total) by Each Nostril route once daily. 16 g 11    losartan (COZAAR) 25 MG tablet Take 1 tablet (25 mg total) by mouth once daily. 90 tablet 3     No current facility-administered medications on file prior to visit.       History reviewed. No pertinent past medical history.    History reviewed. No pertinent surgical history.     Family History   Problem Relation Age of Onset    Hypertension Father     Cancer Sister         Leiomyosarcoma    Breast cancer Other        Social History     Socioeconomic  History    Marital status:    Tobacco Use    Smoking status: Former     Packs/day: 1.00     Years: 15.00     Pack years: 15.00     Types: Cigarettes     Quit date: 2008     Years since quittin.6    Smokeless tobacco: Former   Substance and Sexual Activity    Alcohol use: Not Currently    Drug use: Never    Sexual activity: Yes   Social History Narrative    Stay at home mom.       Review of patient's allergies indicates:  No Known Allergies     Vitals:    22 1320   BP: (!) 160/102   Pulse: 84        Physical Exam  Constitutional:       General: She is not in acute distress.     Appearance: Normal appearance. She is well-developed.   HENT:      Head: Normocephalic and atraumatic.      Right Ear: External ear normal.      Left Ear: External ear normal.   Eyes:      Conjunctiva/sclera: Conjunctivae normal.   Cardiovascular:      Rate and Rhythm: Normal rate and regular rhythm.      Heart sounds: No murmur heard.    No friction rub. No gallop.   Pulmonary:      Effort: Pulmonary effort is normal. No respiratory distress.      Breath sounds: No wheezing, rhonchi or rales.   Abdominal:      General: Abdomen is flat. There is no distension.   Musculoskeletal:         General: No swelling or deformity.      Right lower leg: No edema.      Left lower leg: No edema.   Skin:     General: Skin is warm and dry.      Coloration: Skin is not jaundiced.   Neurological:      Mental Status: She is alert and oriented to person, place, and time. Mental status is at baseline.   Psychiatric:         Attention and Perception: Attention and perception normal.         Mood and Affect: Mood normal.         Speech: Speech normal.         Behavior: Behavior normal. Behavior is cooperative.         Thought Content: Thought content normal.         Cognition and Memory: Cognition normal.         Judgment: Judgment normal.        1. Essential hypertension  Assessment & Plan:  Unchanged. Increase losartan. Home sleep study.  Counseled on exercise. Continue alcohol cessation. Labs to monitor creatinine. Testing renin/aldosterone but may be influenced by ARB use.    Orders:  -     Home Sleep Studies  -     losartan (COZAAR) 50 MG tablet  -     Basic Metabolic Panel  -     Aldosterone/Renin Activity Ratio    2. Class 2 severe obesity due to excess calories with serious comorbidity and body mass index (BMI) of 35.0 to 35.9 in adult  -     Home Sleep Studies    3. Iron deficiency anemia secondary to inadequate dietary iron intake  -     ferrous sulfate 325 (65 FE) MG EC tablet    4. Seasonal allergic rhinitis due to pollen      Follow up in 3 months    Sean Lucero MD  09/08/2022      Statement Selected

## 2023-06-19 NOTE — ED PROVIDER NOTE - NS HIV RISK FACTOR YES
38 yo female w worsening headache over past few days. Nauseous today am. Will administer IV antiemetics, IV fluids. Monitor and reassess. Declined

## 2024-02-07 NOTE — DISCHARGE NOTE OB - CLICK TO LAUNCH ORM
Acupuncture Visit:     Subjective   Patient ID: S[place] Park is a 67 y.o. female who presents for No chief complaint on file.  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  Worse this past year since August  Working with psychiatrist, on meds     Neuropathy R foot- tingling, numbness, pain at night  Constant, can be worse at night  Has had PT, chiro- doing estim    Balance is poor, sometimes with dizziness, needs support  No falls    Chemo brain- trouble recalling things occ    Has essential tremors. Has gotten worse    MVI  D  Ca    Sleep- always fatigued due to meds, lyrica,   Fatigue is constant    Diet: good             Pre-treatment Assessment  Pain Score: 3  Anxiety Level (0-10): 2  Stress Level (0-10): 4  Coping Level (0-10): 4  Depression Level (0-10): 4  Fatigue Level (0-10): 7  Nausea Level (0-10): 0  Wellbeing Level (0-10): 2    Review of Systems   Constitutional:  Negative for chills and fever.        Night sweats   HENT:  Negative for ear pain, tinnitus and trouble swallowing.    Eyes:  Negative for visual disturbance.   Respiratory:  Negative for cough and wheezing.    Cardiovascular:  Negative for chest pain and palpitations.   Gastrointestinal:  Negative for abdominal distention and constipation.   Genitourinary:  Negative for difficulty urinating and frequency.   Musculoskeletal:  Negative for arthralgias.   Skin:  Positive for rash.        psoriasisi   Neurological:  Negative for headaches.            Provider reviewed plan for the acupuncture session, precautions and  contraindications. Patient/guardian/hospital staff has given consent to treat with full understanding of what to expect during the session. Before acupuncture began, provider explained to the patient to communicate at any time if the procedure was causing discomfort past their tolerance level. Patient agreed to advise acupuncturist. The acupuncturist counseled the patient on the risks of acupuncture treatment including pain, infection, bleeding, and no relief of pain. The patient was positioned comfortably. There was no evidence of infection at the site of needle insertions.    Objective   Physical Exam       Acupuncture Treatment  Needle Guage: 36 guage /.20/ Blue seirin  Body Points: With retention  Body Points - Bilateral: Du 20, Li4, Si3, SP6, KI6, LR3  Body Points - Right: GB40, UB62, ba nadeem  Auricular Points: Yes  Auricular Points - Bilateral: BFA 1-3  Other Techniques Utilized: TDP Lamp  TDP Lamp Descripton: feet  Needle Count In: 22  Needle Count Out: 22  Needle Retention Time (min): 25 minutes  Total Face to Face Time (min): 45 minutes                 Post-treatment Assessment  Pain Score: 2  Anxiety Level (0-10): 2  Stress Level (0-10): 2  Coping Level (0-10): 5  Depression Level (0-10): 2  Fatigue Level (0-10): 5  Nausea Level (0-10): 0  Wellbeing Level (0-10): 2    Assessment/Plan        .

## 2025-04-03 ENCOUNTER — EMERGENCY (EMERGENCY)
Facility: HOSPITAL | Age: 35
LOS: 1 days | Discharge: HOME | End: 2025-04-03
Attending: STUDENT IN AN ORGANIZED HEALTH CARE EDUCATION/TRAINING PROGRAM | Admitting: STUDENT IN AN ORGANIZED HEALTH CARE EDUCATION/TRAINING PROGRAM
Payer: MEDICAID

## 2025-04-03 VITALS
RESPIRATION RATE: 16 BRPM | DIASTOLIC BLOOD PRESSURE: 64 MMHG | SYSTOLIC BLOOD PRESSURE: 110 MMHG | TEMPERATURE: 98 F | OXYGEN SATURATION: 100 % | HEART RATE: 89 BPM

## 2025-04-03 VITALS
DIASTOLIC BLOOD PRESSURE: 70 MMHG | TEMPERATURE: 98 F | WEIGHT: 199.96 LBS | OXYGEN SATURATION: 100 % | SYSTOLIC BLOOD PRESSURE: 125 MMHG | HEART RATE: 126 BPM | RESPIRATION RATE: 17 BRPM

## 2025-04-03 LAB
ADD ON TEST-SPECIMEN IN LAB: SIGNIFICANT CHANGE UP
ALBUMIN SERPL ELPH-MCNC: 3.8 G/DL — SIGNIFICANT CHANGE UP (ref 3.3–5)
ALP SERPL-CCNC: 93 U/L — SIGNIFICANT CHANGE UP (ref 40–120)
ALT FLD-CCNC: 14 U/L — SIGNIFICANT CHANGE UP (ref 4–33)
ANION GAP SERPL CALC-SCNC: 15 MMOL/L — HIGH (ref 7–14)
AST SERPL-CCNC: 15 U/L — SIGNIFICANT CHANGE UP (ref 4–32)
BASOPHILS # BLD AUTO: 0.04 K/UL — SIGNIFICANT CHANGE UP (ref 0–0.2)
BASOPHILS NFR BLD AUTO: 0.3 % — SIGNIFICANT CHANGE UP (ref 0–2)
BILIRUB SERPL-MCNC: 0.3 MG/DL — SIGNIFICANT CHANGE UP (ref 0.2–1.2)
BLOOD GAS VENOUS COMPREHENSIVE RESULT: SIGNIFICANT CHANGE UP
BUN SERPL-MCNC: 7 MG/DL — SIGNIFICANT CHANGE UP (ref 7–23)
CALCIUM SERPL-MCNC: 9.4 MG/DL — SIGNIFICANT CHANGE UP (ref 8.4–10.5)
CHLORIDE SERPL-SCNC: 102 MMOL/L — SIGNIFICANT CHANGE UP (ref 98–107)
CO2 SERPL-SCNC: 22 MMOL/L — SIGNIFICANT CHANGE UP (ref 22–31)
CREAT SERPL-MCNC: 0.57 MG/DL — SIGNIFICANT CHANGE UP (ref 0.5–1.3)
EGFR: 121 ML/MIN/1.73M2 — SIGNIFICANT CHANGE UP
EGFR: 121 ML/MIN/1.73M2 — SIGNIFICANT CHANGE UP
EOSINOPHIL # BLD AUTO: 0.05 K/UL — SIGNIFICANT CHANGE UP (ref 0–0.5)
EOSINOPHIL NFR BLD AUTO: 0.3 % — SIGNIFICANT CHANGE UP (ref 0–6)
GLUCOSE SERPL-MCNC: 97 MG/DL — SIGNIFICANT CHANGE UP (ref 70–99)
HCG SERPL-ACNC: <1 MIU/ML — SIGNIFICANT CHANGE UP
HCT VFR BLD CALC: 28.3 % — LOW (ref 34.5–45)
HGB BLD-MCNC: 8.7 G/DL — LOW (ref 11.5–15.5)
IANC: 10.72 K/UL — HIGH (ref 1.8–7.4)
IMM GRANULOCYTES NFR BLD AUTO: 0.5 % — SIGNIFICANT CHANGE UP (ref 0–0.9)
LYMPHOCYTES # BLD AUTO: 17.3 % — SIGNIFICANT CHANGE UP (ref 13–44)
LYMPHOCYTES # BLD AUTO: 2.52 K/UL — SIGNIFICANT CHANGE UP (ref 1–3.3)
MCHC RBC-ENTMCNC: 20.5 PG — LOW (ref 27–34)
MCHC RBC-ENTMCNC: 30.7 G/DL — LOW (ref 32–36)
MCV RBC AUTO: 66.7 FL — LOW (ref 80–100)
MONOCYTES # BLD AUTO: 1.14 K/UL — HIGH (ref 0–0.9)
MONOCYTES NFR BLD AUTO: 7.8 % — SIGNIFICANT CHANGE UP (ref 2–14)
NEUTROPHILS # BLD AUTO: 10.72 K/UL — HIGH (ref 1.8–7.4)
NEUTROPHILS NFR BLD AUTO: 73.8 % — SIGNIFICANT CHANGE UP (ref 43–77)
NRBC # BLD AUTO: 0 K/UL — SIGNIFICANT CHANGE UP (ref 0–0)
NRBC # FLD: 0 K/UL — SIGNIFICANT CHANGE UP (ref 0–0)
NRBC BLD AUTO-RTO: 0 /100 WBCS — SIGNIFICANT CHANGE UP (ref 0–0)
PLATELET # BLD AUTO: 541 K/UL — HIGH (ref 150–400)
POTASSIUM SERPL-MCNC: 3.9 MMOL/L — SIGNIFICANT CHANGE UP (ref 3.5–5.3)
POTASSIUM SERPL-SCNC: 3.9 MMOL/L — SIGNIFICANT CHANGE UP (ref 3.5–5.3)
PROT SERPL-MCNC: 8.5 G/DL — HIGH (ref 6–8.3)
RBC # BLD: 4.24 M/UL — SIGNIFICANT CHANGE UP (ref 3.8–5.2)
RBC # FLD: 17.6 % — HIGH (ref 10.3–14.5)
SODIUM SERPL-SCNC: 139 MMOL/L — SIGNIFICANT CHANGE UP (ref 135–145)
WBC # BLD: 14.54 K/UL — HIGH (ref 3.8–10.5)
WBC # FLD AUTO: 14.54 K/UL — HIGH (ref 3.8–10.5)

## 2025-04-03 PROCEDURE — 71260 CT THORAX DX C+: CPT | Mod: 26

## 2025-04-03 PROCEDURE — 99285 EMERGENCY DEPT VISIT HI MDM: CPT

## 2025-04-03 PROCEDURE — 93010 ELECTROCARDIOGRAM REPORT: CPT

## 2025-04-03 RX ORDER — FENTANYL CITRATE-0.9 % NACL/PF 100MCG/2ML
50 SYRINGE (ML) INTRAVENOUS ONCE
Refills: 0 | Status: DISCONTINUED | OUTPATIENT
Start: 2025-04-03 | End: 2025-04-03

## 2025-04-03 RX ORDER — DOXYCYCLINE HYCLATE 100 MG
1 TABLET ORAL
Qty: 20 | Refills: 0
Start: 2025-04-03 | End: 2025-04-12

## 2025-04-03 RX ORDER — VANCOMYCIN HCL IN 5 % DEXTROSE 1.5G/250ML
1000 PLASTIC BAG, INJECTION (ML) INTRAVENOUS ONCE
Refills: 0 | Status: COMPLETED | OUTPATIENT
Start: 2025-04-03 | End: 2025-04-03

## 2025-04-03 RX ORDER — ONDANSETRON HCL/PF 4 MG/2 ML
4 VIAL (ML) INJECTION ONCE
Refills: 0 | Status: COMPLETED | OUTPATIENT
Start: 2025-04-03 | End: 2025-04-03

## 2025-04-03 RX ORDER — PIPERACILLIN-TAZO-DEXTROSE,ISO 3.375G/5
3.38 IV SOLUTION, PIGGYBACK PREMIX FROZEN(ML) INTRAVENOUS ONCE
Refills: 0 | Status: COMPLETED | OUTPATIENT
Start: 2025-04-03 | End: 2025-04-03

## 2025-04-03 RX ORDER — LIDOCAINE HCL/EPINEPHRINE/PF 1 %-1:200K
10 AMPUL (ML) INJECTION ONCE
Refills: 0 | Status: COMPLETED | OUTPATIENT
Start: 2025-04-03 | End: 2025-04-03

## 2025-04-03 RX ORDER — ONDANSETRON HCL/PF 4 MG/2 ML
4 VIAL (ML) INJECTION ONCE
Refills: 0 | Status: DISCONTINUED | OUTPATIENT
Start: 2025-04-03 | End: 2025-04-03

## 2025-04-03 RX ADMIN — Medication 6 MILLIGRAM(S): at 21:02

## 2025-04-03 RX ADMIN — Medication 4 MILLIGRAM(S): at 17:33

## 2025-04-03 RX ADMIN — Medication 250 MILLIGRAM(S): at 17:33

## 2025-04-03 RX ADMIN — Medication 200 GRAM(S): at 16:34

## 2025-04-03 RX ADMIN — Medication 50 MICROGRAM(S): at 17:45

## 2025-04-03 RX ADMIN — Medication 4 MILLIGRAM(S): at 16:34

## 2025-04-03 RX ADMIN — Medication 1000 MILLILITER(S): at 16:34

## 2025-04-03 RX ADMIN — Medication 4 MILLIGRAM(S): at 16:45

## 2025-04-03 RX ADMIN — Medication 50 MICROGRAM(S): at 17:33

## 2025-04-03 RX ADMIN — Medication 10 MILLILITER(S): at 21:12

## 2025-04-03 NOTE — ED ADULT NURSE REASSESSMENT NOTE - NS ED NURSE REASSESS COMMENT FT1
Pt a&ox4, in NAD, endorses relief of pain after medication administration, abdomen soft, tender to upper abdomen, non-distended, denies n/v/d, afebrile, vs taken and documented, breathing even and unlabored, ivl clear and patent, will continue to monitor.
handoff received from ECHO hernández. pt A&Ox4, resting in stretcher. pt offers no complaints at this time. respirations even and unlabored. pt awaiting surgery team eval. safety maintained, call bell within reach
surgery team at bedside for wound drainage, pt endorsing 10/10 pain appears uncomfortable. medicated per MAR

## 2025-04-03 NOTE — ED PROVIDER NOTE - PATIENT PORTAL LINK FT
You can access the FollowMyHealth Patient Portal offered by [facility] by registering at the following website: [email or web address]. By joining [employer]’s FollowMyHealth portal, you will also be able to view your health information using other applications (apps) compatible with our system.

## 2025-04-03 NOTE — ED PROVIDER NOTE - ATTENDING CONTRIBUTION TO CARE
34-year-old female with a past medical history of hidradenitis suppurativa which is required surgical intervention over 10 years ago presents to the ED complaining of worsening right axillary pain and swelling which states feels like her prior hidradenitis.  She endorses subjective fevers chills with fatigue.  She does not do warm compresses and over-the-counter medication without relief.  She also endorses some numbness along her right lateral arm between the elbow and shoulder.  She denies any neck pain, chest pain, shortness of breath, urinary symptoms, GI symptoms.    Physical exam:  Pleasant female in moderate to severe distress secondary to pain.  Heart is tachycardic without murmurs rubs or gallops  Lungs are clear to auscultation in all lung fields without wheezing rales or rhonchi  Abdomen is soft, nontender, nondistended  Right axillary skin fold shows induration with fluctuance, excessively tender with warmth but there is no surrounding erythema (contrary to resident eval), no drainage.    MDM:  Patient presents to the ED due to right armpit swelling.  History of at bedtime.  Vital signs reviewed.  Cardiac to 126, afebrile and normotensive.    My independent interpretation of the EKG shows sinus tachycardia 118 bpm, normal axis, normal intervals, no acute ST segment elevation or depression, no STEMI.    Will give patient morphine for pain control.  Given that she is tachycardic, this could be related to infectious versus pain.  In the event that there is sepsis patient started on IV antibiotics early on in the course.  She was started on Vanco and Zosyn.      Will obtain CBC, CMP, VBG, blood cultures, hCG and CT chest to evaluate for worsening infection.  Dispo pending.

## 2025-04-03 NOTE — ED ADULT NURSE NOTE - OBJECTIVE STATEMENT
Pt. presents to intake c/o right axillary pain/infection foul smelling. Pt. has HS. Pending lab/ CT res Pt. presents to intake c/o right axillary pain/infection foul smelling. Pt. has HS. Pt. typically able to deal w/ at home.  however it is getting bigger and more painful.   denies fever/chills SOB CP abd pain n/v/d dysuria.  RAC20G labs sent medicated per order. Pending lab/ CT res

## 2025-04-03 NOTE — ED ADULT NURSE NOTE - NS ED NURSE LEVEL OF CONSCIOUSNESS SPEECH
Name: Myesha Pineda  : 1987         Chief Complaint:     Chief Complaint   Patient presents with    Arm Pain     Started in right arm, \"sharp pain\" to touch, radiated into right axilla, then across her chest into left axilla and arm, sometimes tender breasts. Started 4 weeks ago. Comes and goes and pain is not always in the same spot    Gastroesophageal Reflux     Took aleve for arm and chest pain       History of Present Illness:      Myesha Pineda is a 37 y.o.  female who presents with Arm Pain (Started in right arm, \"sharp pain\" to touch, radiated into right axilla, then across her chest into left axilla and arm, sometimes tender breasts. Started 4 weeks ago. Comes and goes and pain is not always in the same spot) and Gastroesophageal Reflux (Took aleve for arm and chest pain)      HPI    Approx Nov-Dec had sharp pains in fingers, various sites, no swelling. Went away and then started having pains in back which were sharp also, went to chiro and it did resolve. Now 3 wks ago started having intermittent pain R posterior upper arm that would shoot into axilla and R breast, eventually whole chest had pains but also specific areas that would be painful to touch and some pain that would shoot into L neck. Today pain is pretty mild, no diffuse chest pain but soreness to touch R lateral breast. During the sharp diffuse CP she also had a lot of palpitations. Pain feels like it's under the surface of her skin, doesn't feel like it's deep.     Night before last had sharp pain RUQ and epigastric pain which persists a little. Had also taken aleve which doesn't typically bother her, just 1 dose for a headache.     Has tried dry brushing of arms and chest which helped a little with pains, less sharp.     Struggling with depression, mainly with general things that are happening rather than things in her own life. Declines meds or counseling.    Brain cancer in multiple family members, was recommended to have  Speaking Coherently

## 2025-04-03 NOTE — ED ADULT TRIAGE NOTE - CHIEF COMPLAINT QUOTE
pt reporting to the ED for painful "lump" to R axilla area. Denies drainage at this time. pmh of  Hidradenitis Suppurativa. pt tachycardic in triage, Awaiting ekg

## 2025-04-03 NOTE — ED PROVIDER NOTE - PROGRESS NOTE DETAILS
Cem Ryan MD PGY-1: Feeling warm, having some abdominal discomfort and nausea. Suspect possible reaction to morphine, will give fentanyl instead as well as some zofran, add on lipase. Anemic to 8.7, reports history of anemia but not on iron, denies bleeding, not currently on period. Vincent Mitchell DO (ED Attending):    Patient complaining of epigastric abdominal pain and nausea.  Will hold off on morphine.  Will give Zofran and fentanyl.  Will reassess.  Vital signs are stable. Cem Ryan MD PGY-1: surgery consulted for abscess iso HS with multiple recurrences Cem Ryan MD PGY-1: s/p I&D by surgery, feeling improved. Will DC home on doxycycline with outpatient surgery followup.

## 2025-04-03 NOTE — ED PROVIDER NOTE - CLINICAL SUMMARY MEDICAL DECISION MAKING FREE TEXT BOX
34-year-old female with a history of hidradenitis suppurativa with multiple surgical interventions in the past as well as incision and drainages presenting for evaluation of right axillary pain, swelling, concern for worsening abscess.  She has been ongoing for about a week with subjective fevers/chills, fatigue and worsening pain.  Has attempted to treat it with warm compresses at home as well as over-the-counter pain medications.  Having numbness in her arm as well.  Denies any drainage from the site.  Has gotten to the point where she is unable to lift her arm above her shoulder.  No chest pain, shortness of breath, abdominal pain, urinary symptoms, cough, congestion.    Vitals /70, , T 97.7, 100% on RA    GENERAL: Awake, alert, NAD  HEAD: NC/AT, neck supple, moist mucous membranes  EYES: PERRL, EOM grossly intact, sclera anicteric  LUNGS: normal WOB on RA, CTAB, no wheezes or crackles   CARDIAC: tachcyardic, regular, no m/r/g, WWP  EXT: No edema,  no deformities.   NEURO: A&Ox3. Moving all extremities.  SKIN: Warm and dry. No rash. Right axillary fold with induration and some fluctuance, exquisitely tender with some warmth and surrounding erythema  PSYCH: Normal affect.      A/P  Right axilla induration with some fluctuance but difficult to assess as site is exquisitely tender. EKG with sinus tachycardia without acute ischemic changes.  Given tachycardia in setting of hidradenitis suppurativa with suspected abscess in right axilla, will obtain laboratory workup including CBC, CMP, VBG, beta hCG and will obtain CT chest with IV contrast to assess for deeper infection or abscess tracking.  No fever here, but given tachycardia will treat empirically and obtain blood cultures as well.  We will give morphine, fluids and reassess after CT scan with patient will likely require surgical consult.

## 2025-04-03 NOTE — CONSULT NOTE ADULT - SUBJECTIVE AND OBJECTIVE BOX
[facility] CONSULT NOTE  DANETTE MCDANIELS  |  [id number]  |  04-03-25 @ 20:09    CC: Patient is a 34y old  Female who presents with a chief complaint of right axillary pain     HPI: 34F PMH asthma and hidradenitis suppurativa p/w right axillary pain. Pain started about a week ago but she has been dealing with these issues for a while. Describes abscesses that she sometimes "squeezes" to evacuate in bilateral axillae and thighs/groins. Currently only endorsing pain in the R axilla, swelling, subjective fevers. Previously had surgery at [facility] on the left axilla with Dr. Kohler, and previously has undergone I&D in 2017 on the R axilla in the ED.     INTERVAL EVENTS: In the ED, hemodynamically normal, afebrile. WBC elevated 14.5. CT showing 4.9cm rim-enhancing collection in the right axillary fold. Surgery consulted.     REVIEW OF SYSTEMS:  General: denies weight change, fever or fatigue  HEENT: denies sore throat, hoarseness  Respiratory: denies cough, shortness of breath at rest and on exertion, wheezing  Cardiovascular: denies chest pain, abnormal heart rhythm, PND, palpitations  Gastrointestinal: denies nausea, vomiting, diarrhea, bloody or black bowel movements  Genitourinary: denies frequent urination, painful urination, kidney disease  Neurological: denies seizures, headaches  Muscoloskeletal: denies any joint pains  Psychiatric: denies depression, anxiety    PAST MEDICAL & SURGICAL HISTORY:  Asthma  Hidradenitis Suppurative     MEDICATIONS  (STANDING):  lidocaine 1%/epinephrine 1:100,000 Inj 10 milliLiter(s) Local Injection Once    MEDICATIONS  (PRN):    Allergies  No Known Allergies    SOCIAL HISTORY: nonsmoker    FAMILY HISTORY: noncontributory    Objective:   Vital Signs Last 24 Hrs  T(C): 36.8 (03 Apr 2025 20:06), Max: 36.8 (03 Apr 2025 20:06)  T(F): 98.2 (03 Apr 2025 20:06), Max: 98.2 (03 Apr 2025 20:06)  HR: 88 (03 Apr 2025 20:06) (85 - 126)  BP: 131/75 (03 Apr 2025 20:06) (123/78 - 131/75)  BP(mean): --  RR: 16 (03 Apr 2025 20:06) (16 - 17)  SpO2: 100% (03 Apr 2025 20:06) (100% - 100%)        Physical Exam:  General: NAD, resting comfortably   CV: regular rate and rhythm   Pulm: no increased work of breathing   Abdomen: soft, nontender, nondistended, no rebound or guarding    Extremities: warm and well perfused      LABS:                        8.7    14.54 )-----------( 541      ( 03 Apr 2025 16:20 )             28.3     04-03    139  |  102  |  7   ----------------------------<  97  3.9   |  22  |  0.57    Ca    9.4      03 Apr 2025 16:20    TPro  8.5[H]  /  Alb  3.8  /  TBili  0.3  /  DBili  x   /  AST  15  /  ALT  14  /  AlkPhos  93  04-03      LIVER FUNCTIONS - ( 03 Apr 2025 16:20 )  Alb: 3.8 g/dL / Pro: 8.5 g/dL / ALK PHOS: 93 U/L / ALT: 14 U/L / AST: 15 U/L / GGT: x           Urinalysis Basic - ( 03 Apr 2025 16:20 )    Color: x / Appearance: x / SG: x / pH: x  Gluc: 97 mg/dL / Ketone: x  / Bili: x / Urobili: x   Blood: x / Protein: x / Nitrite: x   Leuk Esterase: x / RBC: x / WBC x   Sq Epi: x / Non Sq Epi: x / Bacteria: x              RADIOLOGY & ADDITIONAL STUDIES:     CT Chest w/ IV Cont (04.03.25 @ 18:25)     IMPRESSION:  A 4.9 cm collection concerning for abscess within the subcutaneous   tissues at the right axillary fold. Enlarged right axillary lymph nodes,   likely reactive.

## 2025-04-03 NOTE — ED PROVIDER NOTE - CARE PROVIDER_API CALL
Ac Corado [facility]  Surgery  [id number] [place] [id number]  Phone: [phone]  Fax: [phone]  Follow Up Time:

## 2025-04-03 NOTE — CONSULT NOTE ADULT - ASSESSMENT
Assessment: 34F PMH asthma and hidradenitis suppurativa p/w right axillary pain i/s/o recurrent abscesses, here with leukocytosis and CT showing 4.9cm rim-enhancing collection in the right axillary fold concerning for abscess.     Recommendations:   - Incision and drainage to be performed of right axillary abscess     Assessment: 34F PMH asthma and hidradenitis suppurativa p/w right axillary pain i/s/o recurrent abscesses, here with leukocytosis and CT showing 4.9cm rim-enhancing collection in the right axillary fold concerning for abscess.     Recommendations:   - Incision and drainage performed of right axillary abscess (see separate procedure note) with loose packing left in place  - Patient instructed to remove packing with next shower - verbalizes understanding to do this   - Antibiotics, Local wound care at home, outpatient follow up (Dr. Ac Corado)     Discussed with Dr. Corado     Please contact [facility] (p. [phone]) with any questions.     Dana Alejandro DO, PhD  [facility]  Pager [phone]

## 2025-04-03 NOTE — ED PROVIDER NOTE - NSFOLLOWUPINSTRUCTIONS_ED_ALL_ED_FT
You were seen for an abscess.     You received antibiotics and surgery was consulted to drain the affected area. Follow their wound care instructions. Remove packing with next shower    Take antibiotic sent to your pharmacy as instructed.    Tylenol and ibuprofen as needed for pain, cold compresses.     Followup with Dr. Ac Corado    Return to emergency department for: chest pain, difficulty breathing, persistent fevers, worsening pain, confusion, fast heart rate.

## 2025-04-04 LAB
GRAM STN FLD: ABNORMAL
SPECIMEN SOURCE: SIGNIFICANT CHANGE UP

## 2025-04-04 NOTE — ED POST DISCHARGE NOTE - RESULT SUMMARY
Lab called with results of Gram stain.  Patient with lots of PMNs also gram-positive cocci in clusters and gram-positive rods on the oilfield.  This is from an abscess.  Patient received antibiotics both in the emergency department and upon discharge.  No definitive culture results yet

## 2025-04-07 LAB
-  CLINDAMYCIN: SIGNIFICANT CHANGE UP
-  ERYTHROMYCIN: SIGNIFICANT CHANGE UP
-  GENTAMICIN: SIGNIFICANT CHANGE UP
-  OXACILLIN: SIGNIFICANT CHANGE UP
-  RIFAMPIN: SIGNIFICANT CHANGE UP
-  TETRACYCLINE: SIGNIFICANT CHANGE UP
-  TRIMETHOPRIM/SULFAMETHOXAZOLE: SIGNIFICANT CHANGE UP
-  VANCOMYCIN: SIGNIFICANT CHANGE UP
METHOD TYPE: SIGNIFICANT CHANGE UP

## 2025-04-08 LAB
CULTURE RESULTS: SIGNIFICANT CHANGE UP
CULTURE RESULTS: SIGNIFICANT CHANGE UP
SPECIMEN SOURCE: SIGNIFICANT CHANGE UP
SPECIMEN SOURCE: SIGNIFICANT CHANGE UP

## 2025-04-13 LAB
CULTURE RESULTS: ABNORMAL
ORGANISM # SPEC MICROSCOPIC CNT: ABNORMAL
ORGANISM # SPEC MICROSCOPIC CNT: ABNORMAL
SPECIMEN SOURCE: SIGNIFICANT CHANGE UP